# Patient Record
Sex: MALE | Race: WHITE | NOT HISPANIC OR LATINO | Employment: STUDENT | URBAN - METROPOLITAN AREA
[De-identification: names, ages, dates, MRNs, and addresses within clinical notes are randomized per-mention and may not be internally consistent; named-entity substitution may affect disease eponyms.]

---

## 2018-04-13 ENCOUNTER — OFFICE VISIT (OUTPATIENT)
Dept: URGENT CARE | Facility: CLINIC | Age: 23
End: 2018-04-13
Payer: COMMERCIAL

## 2018-04-13 VITALS
HEIGHT: 66 IN | TEMPERATURE: 97.3 F | HEART RATE: 94 BPM | SYSTOLIC BLOOD PRESSURE: 130 MMHG | DIASTOLIC BLOOD PRESSURE: 78 MMHG | WEIGHT: 155 LBS | BODY MASS INDEX: 24.91 KG/M2 | OXYGEN SATURATION: 100 %

## 2018-04-13 DIAGNOSIS — N45.1 EPIDIDYMITIS: Primary | ICD-10-CM

## 2018-04-13 PROCEDURE — 99203 OFFICE O/P NEW LOW 30 MIN: CPT | Performed by: PHYSICIAN ASSISTANT

## 2018-04-13 RX ORDER — CIPROFLOXACIN 500 MG/1
500 TABLET, FILM COATED ORAL EVERY 12 HOURS SCHEDULED
Qty: 14 TABLET | Refills: 0 | Status: SHIPPED | OUTPATIENT
Start: 2018-04-13 | End: 2018-04-20

## 2018-04-13 NOTE — PROGRESS NOTES
[facility]        NAME: Jaimee Faulkner is a 25 y o  male  : 1995    MRN: [id number]  DATE: 2018  TIME: 12:46 PM    Assessment and Plan   Epididymitis [N45 1]  1  Epididymitis  ciprofloxacin (CIPRO) 500 mg tablet       Patient's symptoms of a secondary to an acute epididymitis on the right side  Patient Instructions     Patient Instructions     Epididymitis   WHAT YOU SHOULD KNOW:   Epididymitis is inflammation of your epididymis  The epididymis is a long curled tube inside your scrotum  It stores and carries sperm from your testicles to your penis  Acute epididymitis lasts for 6 weeks or less and becomes chronic if it lasts longer than 3 months  AFTER YOU LEAVE:   Medicines:   · Antibiotics: This medicine is given if epididymitis is caused by a bacterial infection  Take them as directed  · NSAIDs:  These medicines decrease swelling, pain, and fever  NSAIDs are available without a doctor's order  Ask which medicine is right for you and how much to take  Take as directed  NSAIDs can cause stomach bleeding or kidney problems if not taken correctly  · Pain medicine: You may be given a prescription medicine to decrease pain  Do not wait until the pain is severe before you take this medicine  · Take your medicine as directed  Call your healthcare provider if you think your medicine is not helping or if you have side effects  Tell him if you are allergic to any medicine  Keep a list of the medicines, vitamins, and herbs you take  Include the amounts, and when and why you take them  Bring the list or the pill bottles to follow-up visits  Carry your medicine list with you in case of an emergency  Follow up with your healthcare provider as directed: You may need to return for blood tests or other testing after you are done with treatment  Write down your questions so you remember to ask them during your visits  Self-care:   · Ice:  Ice helps decrease swelling and pain   Ice may also help prevent tissue damage  Use an ice pack or put crushed ice in a plastic bag  Cover it with a towel and place it on your swollen testicle or scrotum for 15 to 20 minutes every hour as directed  · Rest:  Rest or decreased activity may help decrease your pain  It may also help you heal faster  Return to normal activities as directed  · Safe sex:  Use a latex condom during oral, vaginal, or anal sex  Do not have sex with someone who has an STI  If you have an infection, let your sexual partner know so they can be checked for an STI and treated if needed  Do not have sex while you or your partner is being treated for an STI, or until your healthcare provider says that it is okay  · Scrotal support: You may be told to put a pillow or rolled up towel under your scrotum to elevate your scrotum when you sit or lie down  This may help reduce your pain  An athletic supporter may make you more comfortable when you stand  Contact your healthcare providerif:   · You have a fever  · Your signs and symptoms do not improve within 3 days of treatment or come back after treatment  · You have questions or concerns about your condition or care  Seek care immediately or call 911 if:   · You feel lightheaded or faint  · You have severe pain in your testicles that starts suddenly or follows an injury  · Your symptoms become worse, even after you start treatment with medicine  © 2014 [employer] is for End User's use only and may not be sold, redistributed or otherwise used for commercial purposes  All illustrations and images included in CareNotes® are the copyrighted property of [employer]  or [employer]  The above information is an  only  It is not intended as medical advice for individual conditions or treatments   Talk to your doctor, nurse or pharmacist before following any medical regimen to see if it is safe and effective for you           Chief Complaint     Chief Complaint   Patient presents with    Groin Pain     x1 week          History of Present Illness   Yvon Officer presents to the clinic c/o      32-year-old male presents for evaluation of right-sided mass and pain just above his testicle  Patient denies any previous injuries to his testicles  She denies any previous testicular issues from childbirth  Denies any acute trauma to the site  He states the pain is not made worse with strenuous activity/lifting  Patient denies any urinary complaints, dysuria  Patient states he is not currently sexually active, and he has never had any known history of sexually transmitted disease  States the pain is made worse with running or with touching at the site  Review of Systems   Review of Systems   Respiratory: Negative  Cardiovascular: Negative  Current Medications     No long-term prescriptions on file  Current Allergies     Allergies as of 04/13/2018    (No Known Allergies)            The following portions of the patient's history were reviewed and updated as appropriate: allergies, current medications, past family history, past medical history, past social history, past surgical history and problem list     Objective   /78   Pulse 94   Temp (!) 97 3 °F (36 3 °C) (Tympanic)   Ht 5' 6" (1 676 m)   Wt 70 3 kg (155 lb)   SpO2 100%   BMI 25 02 kg/m²        Physical Exam     Physical Exam   Constitutional: He appears well-developed and well-nourished  No distress  Cardiovascular: Normal rate, regular rhythm and normal heart sounds  Exam reveals no gallop and no friction rub  No murmur heard  Pulmonary/Chest: Effort normal and breath sounds normal  No respiratory distress  He has no wheezes  He has no rales  Abdominal: Hernia confirmed negative in the right inguinal area and confirmed negative in the left inguinal area  Genitourinary: Cremasteric reflex is present   Right testis shows swelling and tenderness  Genitourinary Comments:   Tenderness to palpation of the right epididymitis  No visible bruising  No tenderness to palpation of the testicles bilaterally  No erythema, ecchymosis around scrotum  Cremaster reflex is present bilaterally  Skin: He is not diaphoretic  Nursing note and vitals reviewed

## 2018-04-13 NOTE — PATIENT INSTRUCTIONS
Epididymitis   WHAT YOU SHOULD KNOW:   Epididymitis is inflammation of your epididymis  The epididymis is a long curled tube inside your scrotum  It stores and carries sperm from your testicles to your penis  Acute epididymitis lasts for 6 weeks or less and becomes chronic if it lasts longer than 3 months  AFTER YOU LEAVE:   Medicines:   · Antibiotics: This medicine is given if epididymitis is caused by a bacterial infection  Take them as directed  · NSAIDs:  These medicines decrease swelling, pain, and fever  NSAIDs are available without a doctor's order  Ask which medicine is right for you and how much to take  Take as directed  NSAIDs can cause stomach bleeding or kidney problems if not taken correctly  · Pain medicine: You may be given a prescription medicine to decrease pain  Do not wait until the pain is severe before you take this medicine  · Take your medicine as directed  Call your healthcare provider if you think your medicine is not helping or if you have side effects  Tell him if you are allergic to any medicine  Keep a list of the medicines, vitamins, and herbs you take  Include the amounts, and when and why you take them  Bring the list or the pill bottles to follow-up visits  Carry your medicine list with you in case of an emergency  Follow up with your healthcare provider as directed: You may need to return for blood tests or other testing after you are done with treatment  Write down your questions so you remember to ask them during your visits  Self-care:   · Ice:  Ice helps decrease swelling and pain  Ice may also help prevent tissue damage  Use an ice pack or put crushed ice in a plastic bag  Cover it with a towel and place it on your swollen testicle or scrotum for 15 to 20 minutes every hour as directed  · Rest:  Rest or decreased activity may help decrease your pain  It may also help you heal faster  Return to normal activities as directed      · Safe sex:  Use a latex condom during oral, vaginal, or anal sex  Do not have sex with someone who has an STI  If you have an infection, let your sexual partner know so they can be checked for an STI and treated if needed  Do not have sex while you or your partner is being treated for an STI, or until your healthcare provider says that it is okay  · Scrotal support: You may be told to put a pillow or rolled up towel under your scrotum to elevate your scrotum when you sit or lie down  This may help reduce your pain  An athletic supporter may make you more comfortable when you stand  Contact your healthcare providerif:   · You have a fever  · Your signs and symptoms do not improve within 3 days of treatment or come back after treatment  · You have questions or concerns about your condition or care  Seek care immediately or call 911 if:   · You feel lightheaded or faint  · You have severe pain in your testicles that starts suddenly or follows an injury  · Your symptoms become worse, even after you start treatment with medicine  © 2014 [employer] is for End User's use only and may not be sold, redistributed or otherwise used for commercial purposes  All illustrations and images included in CareNotes® are the copyrighted property of [employer]  or [employer]  The above information is an  only  It is not intended as medical advice for individual conditions or treatments  Talk to your doctor, nurse or pharmacist before following any medical regimen to see if it is safe and effective for you

## 2020-10-22 PROBLEM — R10.9 ABDOMINAL PAIN: Status: ACTIVE | Noted: 2020-10-22
